# Patient Record
Sex: FEMALE | Race: WHITE | NOT HISPANIC OR LATINO | Employment: UNEMPLOYED | URBAN - METROPOLITAN AREA
[De-identification: names, ages, dates, MRNs, and addresses within clinical notes are randomized per-mention and may not be internally consistent; named-entity substitution may affect disease eponyms.]

---

## 2023-03-03 LAB
NON-UH HIE BASO COUNT: 0.04 X1000 (ref 0–0.2)
NON-UH HIE BASOS %: 0.4 %
NON-UH HIE DIFF?: NO
NON-UH HIE EOS COUNT: 0.23 X1000 (ref 0–0.5)
NON-UH HIE EOSIN %: 2.5 %
NON-UH HIE FERRITIN: 57 NG/ML (ref 10–291)
NON-UH HIE FREE T4: 0.94 NG/DL (ref 0.89–1.76)
NON-UH HIE HCT: 37 % (ref 36–46)
NON-UH HIE HGB: 12.3 G/DL (ref 12–16)
NON-UH HIE INSTR WBC: 9.1
NON-UH HIE IRON: 47 UG/DL (ref 40–170)
NON-UH HIE LYMPH %: 29.9 %
NON-UH HIE LYMPH COUNT: 2.72 X1000 (ref 1.2–4.8)
NON-UH HIE MCH: 27.8 PG (ref 27–34)
NON-UH HIE MCHC: 33.3 G/DL (ref 32–37)
NON-UH HIE MCV: 83.6 FL (ref 80–100)
NON-UH HIE MONO %: 5.8 %
NON-UH HIE MONO COUNT: 0.53 X1000 (ref 0.1–1)
NON-UH HIE MPV: 7.5 FL (ref 7.4–10.4)
NON-UH HIE NEUTROPHIL %: 61.4 %
NON-UH HIE NEUTROPHIL COUNT (ANC): 5.58 X1000 (ref 1.4–8.8)
NON-UH HIE NUCLEATED RBC: 0 /100WBC
NON-UH HIE PLATELET: 457 X10 (ref 150–450)
NON-UH HIE RBC: 4.42 X10 (ref 4.2–5.4)
NON-UH HIE RDW: 14.7 % (ref 11.5–14.5)
NON-UH HIE SATURATION: 12.6 % (ref 20–50)
NON-UH HIE TIBC: 372 UG/ML (ref 250–425)
NON-UH HIE TSH: 6.42 UIU/ML (ref 0.55–4.78)
NON-UH HIE VITAMIN B12: 690 PG/ML (ref 211–911)
NON-UH HIE WBC: 9.1 X10 (ref 4.5–11)

## 2023-03-06 LAB — NON-UH HIE ANTI-NUCLEAR ANTIBODY: NEGATIVE

## 2023-03-07 LAB — NON-UH HIE RHEUMATOID FACTOR: NEGATIVE

## 2023-03-09 ENCOUNTER — TELEPHONE (OUTPATIENT)
Dept: PRIMARY CARE | Facility: CLINIC | Age: 33
End: 2023-03-09
Payer: COMMERCIAL

## 2023-03-09 NOTE — TELEPHONE ENCOUNTER
----- Message from Vandana Batista DO sent at 3/7/2023  3:40 PM EST -----  Let pt know her rheumatoid factor and ERIK were both negative.

## 2023-03-14 ENCOUNTER — TELEPHONE (OUTPATIENT)
Dept: PRIMARY CARE | Facility: CLINIC | Age: 33
End: 2023-03-14
Payer: COMMERCIAL

## 2023-03-14 NOTE — TELEPHONE ENCOUNTER
----- Message from Vandana Batista DO sent at 3/13/2023  8:42 PM EDT -----  In addition to prior msg with lab results can you let pt know her ERIK and rheumatoid factor were both negative.     ----- Message -----  From: Mireya Burch CMA  Sent: 3/13/2023   8:20 AM EDT  To: Vandana Batista DO

## 2023-03-14 NOTE — TELEPHONE ENCOUNTER
----- Message from Vandana Batista DO sent at 3/13/2023  8:38 PM EDT -----  Please let pt know their screening thyroid test was slightly out of range but the actual level of circulating thyroid hormone was normal so as long as she is feeling ok, I recommend she continue her current dose of levothyroxine.   Her repeat red blood cell counts are now normal and her iron and vitamin B12 levels are ok.   Her platelet count is now just slightly elevated at 457(normal is less than 450).  I recommend she follow up in a few mo to see how she is doing and recheck labs at that time.     ----- Message -----  From: Mireya Burch CMA  Sent: 3/13/2023   8:20 AM EDT  To: Vandana Batista DO

## 2023-03-28 DIAGNOSIS — E03.9 HYPOTHYROIDISM, UNSPECIFIED TYPE: ICD-10-CM

## 2023-03-28 RX ORDER — LEVOTHYROXINE SODIUM 50 UG/1
1 TABLET ORAL DAILY
COMMUNITY
Start: 2022-08-15 | End: 2023-03-28 | Stop reason: SDUPTHER

## 2023-03-29 RX ORDER — LEVOTHYROXINE SODIUM 50 UG/1
50 TABLET ORAL DAILY
Qty: 30 TABLET | Refills: 3 | Status: SHIPPED | OUTPATIENT
Start: 2023-03-29 | End: 2023-05-01 | Stop reason: SDUPTHER

## 2023-05-01 DIAGNOSIS — E03.9 HYPOTHYROIDISM, UNSPECIFIED TYPE: ICD-10-CM

## 2023-05-01 RX ORDER — LEVOTHYROXINE SODIUM 50 UG/1
50 TABLET ORAL DAILY
Qty: 30 TABLET | Refills: 3 | Status: SHIPPED | OUTPATIENT
Start: 2023-05-01 | End: 2023-07-11 | Stop reason: SDUPTHER

## 2023-06-06 ENCOUNTER — OFFICE VISIT (OUTPATIENT)
Dept: PRIMARY CARE | Facility: CLINIC | Age: 33
End: 2023-06-06
Payer: COMMERCIAL

## 2023-06-06 VITALS
TEMPERATURE: 97.9 F | HEIGHT: 57 IN | SYSTOLIC BLOOD PRESSURE: 107 MMHG | WEIGHT: 163.4 LBS | DIASTOLIC BLOOD PRESSURE: 75 MMHG | HEART RATE: 70 BPM | BODY MASS INDEX: 35.25 KG/M2

## 2023-06-06 DIAGNOSIS — E03.9 HYPOTHYROIDISM, UNSPECIFIED TYPE: ICD-10-CM

## 2023-06-06 DIAGNOSIS — M25.562 ACUTE PAIN OF LEFT KNEE: Primary | ICD-10-CM

## 2023-06-06 DIAGNOSIS — J02.9 SORE THROAT: ICD-10-CM

## 2023-06-06 DIAGNOSIS — B37.2 INTERTRIGINOUS CANDIDIASIS: ICD-10-CM

## 2023-06-06 PROBLEM — F32.A DEPRESSION: Status: ACTIVE | Noted: 2023-06-06

## 2023-06-06 PROBLEM — F41.9 ANXIETY: Status: ACTIVE | Noted: 2022-04-06

## 2023-06-06 PROBLEM — D64.9 ANEMIA: Status: ACTIVE | Noted: 2023-06-06

## 2023-06-06 LAB — POC RAPID STREP: NEGATIVE

## 2023-06-06 PROCEDURE — 99214 OFFICE O/P EST MOD 30 MIN: CPT | Performed by: FAMILY MEDICINE

## 2023-06-06 PROCEDURE — 87880 STREP A ASSAY W/OPTIC: CPT | Performed by: FAMILY MEDICINE

## 2023-06-06 PROCEDURE — 1036F TOBACCO NON-USER: CPT | Performed by: FAMILY MEDICINE

## 2023-06-06 RX ORDER — SERTRALINE HCL 100 MG
150 TABLET ORAL DAILY
COMMUNITY
Start: 2023-05-25 | End: 2024-06-10 | Stop reason: ALTCHOICE

## 2023-06-06 RX ORDER — NYSTATIN 100000 U/G
CREAM TOPICAL 2 TIMES DAILY
Qty: 30 G | Refills: 1 | Status: SHIPPED | OUTPATIENT
Start: 2023-06-06 | End: 2023-06-13

## 2023-06-06 RX ORDER — NYSTATIN 100000 [USP'U]/G
POWDER TOPICAL AS NEEDED
Qty: 60 G | Refills: 2 | Status: SHIPPED | OUTPATIENT
Start: 2023-06-06 | End: 2024-06-10 | Stop reason: ALTCHOICE

## 2023-06-06 ASSESSMENT — PATIENT HEALTH QUESTIONNAIRE - PHQ9
1. LITTLE INTEREST OR PLEASURE IN DOING THINGS: NOT AT ALL
2. FEELING DOWN, DEPRESSED OR HOPELESS: NOT AT ALL
SUM OF ALL RESPONSES TO PHQ9 QUESTIONS 1 AND 2: 0

## 2023-06-06 NOTE — PROGRESS NOTES
"Subjective   Patient ID: Kathleen Chacko is a 33 y.o. female who presents for Knee Pain (Left knee pain that feels like its burning, usually happens when she kneels down. ) and Nasal Congestion (Cough, sore throat, body aches, fever, and chills for 4 days.  Did home covid test and was negative on Sunday.).    HPI   33-year-old female presents complaining of persistent left knee pain.  No known injury but does sit crosslegged a lot  Painful to kneel on it no swelling, redness, warmth.  No history of prior knee issues.  No locking up or giving out.  Was in ER recently with  Palpitations.  Was told it was likely related to anxiety.  hx hypothyroidism- taking levo 50mcg 1.5 pills  Recent tsh/free T4 in er were wnl     Hydrated.  Follow-up if symptoms persist or worsen.  Check x-ray of also complains of A yeast rash in her inguinal area and requests prescription for nystatin cream    Also complains of recent nausea and diarrhea for past few days as well as sore throat, body aches, and chills.  Has not taken her temperature.  Both her kids had recent nausea vomiting and diarrhea she did test for COVID which was negative.  No known exposure to strep   Review of Systems  ROS as stated in HPI  Objective   /75   Pulse 70   Temp 36.6 °C (97.9 °F)   Ht 1.448 m (4' 9\")   Wt 74.1 kg (163 lb 6.4 oz)   BMI 35.36 kg/m²     Physical Exam  Constitutional: A&O; NAD  HEENT: conjunctiva normal. EOMI; PERRLA; lids normal; TM's clear; mild posterior pharyngeal erythema. +PND;   Neck: supple; No lymphadenopathy   Heart: RRR     Lungs: CTA bilateral   Abd: Soft, Nontender, Nondistended  Ext:  No edema; left knee with tenderness along lateral joint line.  Negative Lachman's.  Negative Yeimy's.  No joint instability.    Neuro: No gross neuro deficits    Psych: Judgment, orientation, mood, affect, insight wnl   Breast:  inspection normal.  No masses to palpation; No nipple discharge.  No asymmetry.  Axillae normal.   Genital: External " vaginal area, cervix, uterus, adnexa wnl.  CMT  absent  Assessment/Plan   Problem List Items Addressed This Visit          Endocrine/Metabolic    Hypothyroidism     Other Visit Diagnoses       Acute pain of left knee    -  Primary    Relevant Orders    XR knee left 3 views    Referral to Physical Therapy    Sore throat        Relevant Orders    POCT Rapid Strep A manually resulted (Completed)    Intertriginous candidiasis        Relevant Medications    nystatin (Mycostatin) cream    nystatin (Mycostatin) 100,000 unit/gram powder          Check RST-neg; likely viral; stay hydrated.    Check x-ray of left knee.  Refer to physical therapy.  Follow-up if persists or worsens.  Reviewed records and labs from recent ER visit.  Recent TSH in ER was wnl- continue current dose of levothyroxine.  Rx nystatin cream and powder.

## 2023-06-08 ENCOUNTER — TELEPHONE (OUTPATIENT)
Dept: PRIMARY CARE | Facility: CLINIC | Age: 33
End: 2023-06-08
Payer: COMMERCIAL

## 2023-06-08 NOTE — TELEPHONE ENCOUNTER
----- Message from Vandana Batista DO sent at 6/7/2023  8:13 PM EDT -----  Please let patient know the x-ray of her knee was normal.  I recommend she try physical therapy and let us know if symptoms persist or worsen

## 2023-07-11 DIAGNOSIS — E03.9 HYPOTHYROIDISM, UNSPECIFIED TYPE: ICD-10-CM

## 2023-07-11 RX ORDER — LEVOTHYROXINE SODIUM 50 UG/1
75 TABLET ORAL DAILY
Qty: 135 TABLET | Refills: 1 | Status: SHIPPED | OUTPATIENT
Start: 2023-07-11 | End: 2024-04-04 | Stop reason: SDUPTHER

## 2023-10-19 ENCOUNTER — OFFICE VISIT (OUTPATIENT)
Dept: PRIMARY CARE | Facility: CLINIC | Age: 33
End: 2023-10-19
Payer: COMMERCIAL

## 2023-10-19 VITALS
SYSTOLIC BLOOD PRESSURE: 117 MMHG | DIASTOLIC BLOOD PRESSURE: 71 MMHG | RESPIRATION RATE: 16 BRPM | HEART RATE: 80 BPM | WEIGHT: 169 LBS | BODY MASS INDEX: 36.46 KG/M2 | HEIGHT: 57 IN | TEMPERATURE: 98.3 F | OXYGEN SATURATION: 97 %

## 2023-10-19 DIAGNOSIS — H60.542: ICD-10-CM

## 2023-10-19 DIAGNOSIS — Z00.00 HEALTHCARE MAINTENANCE: ICD-10-CM

## 2023-10-19 DIAGNOSIS — R53.83 OTHER FATIGUE: ICD-10-CM

## 2023-10-19 DIAGNOSIS — E03.9 HYPOTHYROIDISM, UNSPECIFIED TYPE: Primary | ICD-10-CM

## 2023-10-19 DIAGNOSIS — D64.9 ANEMIA, UNSPECIFIED TYPE: ICD-10-CM

## 2023-10-19 PROCEDURE — 1036F TOBACCO NON-USER: CPT | Performed by: FAMILY MEDICINE

## 2023-10-19 PROCEDURE — 99214 OFFICE O/P EST MOD 30 MIN: CPT | Performed by: FAMILY MEDICINE

## 2023-10-19 RX ORDER — FLUOCINOLONE ACETONIDE 0.11 MG/ML
5 OIL AURICULAR (OTIC) 2 TIMES DAILY
Qty: 20 ML | Refills: 0 | Status: SHIPPED | OUTPATIENT
Start: 2023-10-19

## 2023-10-19 NOTE — PROGRESS NOTES
"Subjective   Patient ID: Kathleen Chacko is a 33 y.o. female who presents for Medication Question and Earache (Left ear infection).    HPI   33-year-old female presents for follow-up and complains of left ear canal itching and pain around her jaw and behind her left ear.  Is having some dental issues so is not sure if her neck pain is related to that.  No recent congestion, URI, fevers or chills.  No drainage from the ear.  No history of eczema in the ear canal.  No sore throat.  History of hypothyroidism.  Was started on levothyroxine after labs in August 2022.  Is wondering if she has Hashimoto's or if there is a possibility she may be able to stop the medication at some point.  Patient does complain of fatigue.  Also complains of some palpitations but associates that with her anxiety.    No thyroid tenderness or pain.  She had a baby in april 2022  Is still nursing.  Hx of mild anemia in past  Her periods are more reg. occ heavy flow.  Denies any nausea, vomiting, reflux, diarrhea, constipation, blood in stool or melena.    History of depression. Sees psychiatrist. on zoloft     Co body aches/pains;  RF ERIK were negative in March.  Is wondering if she may have fibromyalgia.    Review of Systems  ROS as stated in HPI    Objective   /71 (BP Location: Right arm, Patient Position: Sitting)   Pulse 80   Temp 36.8 °C (98.3 °F)   Resp 16   Ht 1.448 m (4' 9\")   Wt 76.7 kg (169 lb)   SpO2 97%   BMI 36.57 kg/m²     Physical Exam  Constitutional: A&O; NAD  HEENT: conjunctiva normal. EOMI; PERRLA; lids normal; TM's clear; left canal  with slight cerumen along canal with some dry skin  Neck: supple; No lymphadenopathy   Heart: RRR     Lungs: CTA bilateral   Abd: Soft, Nontender, Nondistended  Ext:  No edema;    Neuro: No gross neuro deficits     Psych: Judgment, orientation, mood, affect, insight wnl   Assessment/Plan   Problem List Items Addressed This Visit             ICD-10-CM    Anemia D64.9    Relevant Orders "    CBC and Auto Differential    Ferritin    Iron and TIBC    Vitamin B12    Hypothyroidism - Primary E03.9    Relevant Orders    Thyroid Stimulating Hormone    T4, free    Thyroid Peroxidase (TPO) Antibody     Other Visit Diagnoses         Codes    Acute eczematoid otitis externa, left ear     H60.542    Relevant Medications    fluocinolone (DermOtic) 0.01 % ear drops    Other fatigue     R53.83    Relevant Orders    Comprehensive Metabolic Panel    Healthcare maintenance     Z00.00    Relevant Orders    Lipid Panel            Check labs/thyroid antibody  Try dermotic prn  Fu with dentist  Fu with rheum

## 2023-11-03 ENCOUNTER — APPOINTMENT (OUTPATIENT)
Dept: OBSTETRICS AND GYNECOLOGY | Facility: CLINIC | Age: 33
End: 2023-11-03
Payer: COMMERCIAL

## 2023-12-11 ENCOUNTER — APPOINTMENT (OUTPATIENT)
Dept: OBSTETRICS AND GYNECOLOGY | Facility: CLINIC | Age: 33
End: 2023-12-11
Payer: COMMERCIAL

## 2024-04-02 ENCOUNTER — TELEPHONE (OUTPATIENT)
Dept: PRIMARY CARE | Facility: CLINIC | Age: 34
End: 2024-04-02
Payer: COMMERCIAL

## 2024-04-02 NOTE — TELEPHONE ENCOUNTER
Caller self, pt is out of thyroid med and needs to be seen to discuss hair loss   Please call pt to see what day you wants her to be seen  [phone]

## 2024-04-04 DIAGNOSIS — E03.9 HYPOTHYROIDISM, UNSPECIFIED TYPE: ICD-10-CM

## 2024-04-04 RX ORDER — LEVOTHYROXINE SODIUM 50 UG/1
75 TABLET ORAL DAILY
Qty: 135 TABLET | Refills: 1 | Status: SHIPPED | OUTPATIENT
Start: 2024-04-04

## 2024-04-04 NOTE — TELEPHONE ENCOUNTER
Pt needs refill of Levothyroxine, I scheduled her for her annual wellness for July 11th at 9:30. Pt asks if you would please refill her med in the mean time. [facility] in [place]  [address]

## 2024-06-07 NOTE — PROGRESS NOTES
"Subjective   Patient ID: Kathleen Chacko is a 34 y.o. female who presents for Pain (Numbness and tingling in both arms and legs.  ).    HPI   35 yo female presents co a couple wk hx of pain in both arms and legs with tingling in both arms and legs  Co neck /upper back /lower back pains  NKI  Has been doing a lot of yard work- digging dirt    Has been using iburpofen and tylenol prn    Hx of headaches/migraines    Pt is a massage therapist    History of hypothyroidism.  Was started on levothyroxine after labs in August 2022.  Taking 50mcg 1.5 pills.   Hx of mild anemia in past  Her periods are more reg. occ heavy flow.  Denies any nausea, vomiting, reflux, diarrhea, constipation, blood in stool or melena.    History of depression. Sees psychiatrist. Off zoloft   And now on wellbutrin     Review of Systems  ROS as stated in HPI     Objective   /69   Pulse 74   Temp 36.7 °C (98.1 °F)   Ht 1.448 m (4' 9\")   Wt 79.8 kg (176 lb)   BMI 38.09 kg/m²     Physical Exam  Constitutional: A&O; NAD  HEENT: conjunctiva normal. EOMI; PERRLA; lids normal; TM's clear;   Neck: supple; No lymphadenopathy   Heart: RRR     Lungs: CTA bilateral   Abd: Soft, Nontender, Nondistended  Ext:  No edema;    Neuro: No gross neuro deficits     Psych: Judgment, orientation, mood, affect, insight wnl  Musculoskeletal: +paraspinal muscle tenderness; no spinous process tenderness;  UE and LE strength and sensation intact.  full shoulder ROM.  +SI joint tenderness;  negative straight leg   negative reverse straight leg. LE reflexes, strength and sensation intact     Assessment/Plan   Problem List Items Addressed This Visit             ICD-10-CM    Hypothyroidism E03.9     Other Visit Diagnoses         Codes    Neck pain    -  Primary M54.2    Relevant Orders    XR cervical spine 2-3 views (Completed)    EMG & nerve conduction    Back pain, unspecified back location, unspecified back pain laterality, unspecified chronicity     M54.9    Relevant " Orders    XR thoracic spine 2 views (Completed)    XR lumbar spine 2-3 views (Completed)    EMG & nerve conduction    Paresthesia of upper and lower extremities of both sides     R20.2    Relevant Orders    XR cervical spine 2-3 views (Completed)    XR thoracic spine 2 views (Completed)    XR lumbar spine 2-3 views (Completed)    EMG & nerve conduction          Check xrays and EMG  Check labs- has orders from oct   Is not fasting- will check lipids at a later date

## 2024-06-10 ENCOUNTER — HOSPITAL ENCOUNTER (OUTPATIENT)
Dept: RADIOLOGY | Facility: EXTERNAL LOCATION | Age: 34
Discharge: HOME | End: 2024-06-10

## 2024-06-10 ENCOUNTER — OFFICE VISIT (OUTPATIENT)
Dept: PRIMARY CARE | Facility: CLINIC | Age: 34
End: 2024-06-10
Payer: COMMERCIAL

## 2024-06-10 ENCOUNTER — LAB (OUTPATIENT)
Dept: LAB | Facility: LAB | Age: 34
End: 2024-06-10
Payer: COMMERCIAL

## 2024-06-10 VITALS
WEIGHT: 176 LBS | BODY MASS INDEX: 37.97 KG/M2 | TEMPERATURE: 98.1 F | HEIGHT: 57 IN | HEART RATE: 74 BPM | DIASTOLIC BLOOD PRESSURE: 69 MMHG | SYSTOLIC BLOOD PRESSURE: 108 MMHG

## 2024-06-10 DIAGNOSIS — E03.9 HYPOTHYROIDISM, UNSPECIFIED TYPE: ICD-10-CM

## 2024-06-10 DIAGNOSIS — M54.9 BACK PAIN, UNSPECIFIED BACK LOCATION, UNSPECIFIED BACK PAIN LATERALITY, UNSPECIFIED CHRONICITY: ICD-10-CM

## 2024-06-10 DIAGNOSIS — R20.2 PARESTHESIA OF UPPER AND LOWER EXTREMITIES OF BOTH SIDES: ICD-10-CM

## 2024-06-10 DIAGNOSIS — M54.2 NECK PAIN: Primary | ICD-10-CM

## 2024-06-10 DIAGNOSIS — D64.9 ANEMIA, UNSPECIFIED TYPE: ICD-10-CM

## 2024-06-10 DIAGNOSIS — M54.2 NECK PAIN: ICD-10-CM

## 2024-06-10 DIAGNOSIS — E55.9 VITAMIN D DEFICIENCY: ICD-10-CM

## 2024-06-10 DIAGNOSIS — R53.83 OTHER FATIGUE: ICD-10-CM

## 2024-06-10 PROCEDURE — 82306 VITAMIN D 25 HYDROXY: CPT

## 2024-06-10 PROCEDURE — 80053 COMPREHEN METABOLIC PANEL: CPT

## 2024-06-10 PROCEDURE — 85025 COMPLETE CBC W/AUTO DIFF WBC: CPT

## 2024-06-10 PROCEDURE — 1036F TOBACCO NON-USER: CPT | Performed by: FAMILY MEDICINE

## 2024-06-10 PROCEDURE — 36415 COLL VENOUS BLD VENIPUNCTURE: CPT

## 2024-06-10 PROCEDURE — 99214 OFFICE O/P EST MOD 30 MIN: CPT | Performed by: FAMILY MEDICINE

## 2024-06-10 PROCEDURE — 84439 ASSAY OF FREE THYROXINE: CPT

## 2024-06-10 PROCEDURE — 83540 ASSAY OF IRON: CPT

## 2024-06-10 PROCEDURE — 83550 IRON BINDING TEST: CPT

## 2024-06-10 PROCEDURE — 82728 ASSAY OF FERRITIN: CPT

## 2024-06-10 PROCEDURE — 82607 VITAMIN B-12: CPT

## 2024-06-10 PROCEDURE — 84443 ASSAY THYROID STIM HORMONE: CPT

## 2024-06-10 PROCEDURE — 86376 MICROSOMAL ANTIBODY EACH: CPT

## 2024-06-10 RX ORDER — BUPROPION HYDROCHLORIDE 150 MG/1
150 TABLET ORAL
COMMUNITY

## 2024-06-10 ASSESSMENT — PATIENT HEALTH QUESTIONNAIRE - PHQ9
SUM OF ALL RESPONSES TO PHQ9 QUESTIONS 1 AND 2: 0
1. LITTLE INTEREST OR PLEASURE IN DOING THINGS: NOT AT ALL
2. FEELING DOWN, DEPRESSED OR HOPELESS: NOT AT ALL

## 2024-06-11 DIAGNOSIS — E55.9 VITAMIN D DEFICIENCY: Primary | ICD-10-CM

## 2024-06-11 LAB
25(OH)D3 SERPL-MCNC: 27 NG/ML (ref 30–100)
ALBUMIN SERPL BCP-MCNC: 4.6 G/DL (ref 3.4–5)
ALP SERPL-CCNC: 83 U/L (ref 33–110)
ALT SERPL W P-5'-P-CCNC: 10 U/L (ref 7–45)
ANION GAP SERPL CALC-SCNC: 15 MMOL/L (ref 10–20)
AST SERPL W P-5'-P-CCNC: 14 U/L (ref 9–39)
BASOPHILS # BLD AUTO: 0.07 X10*3/UL (ref 0–0.1)
BASOPHILS NFR BLD AUTO: 0.8 %
BILIRUB SERPL-MCNC: 0.3 MG/DL (ref 0–1.2)
BUN SERPL-MCNC: 14 MG/DL (ref 6–23)
CALCIUM SERPL-MCNC: 9.8 MG/DL (ref 8.6–10.6)
CHLORIDE SERPL-SCNC: 103 MMOL/L (ref 98–107)
CO2 SERPL-SCNC: 25 MMOL/L (ref 21–32)
CREAT SERPL-MCNC: 0.73 MG/DL (ref 0.5–1.05)
EGFRCR SERPLBLD CKD-EPI 2021: >90 ML/MIN/1.73M*2
EOSINOPHIL # BLD AUTO: 0.25 X10*3/UL (ref 0–0.7)
EOSINOPHIL NFR BLD AUTO: 2.7 %
ERYTHROCYTE [DISTWIDTH] IN BLOOD BY AUTOMATED COUNT: 13.3 % (ref 11.5–14.5)
FERRITIN SERPL-MCNC: 163 NG/ML (ref 8–150)
GLUCOSE SERPL-MCNC: 77 MG/DL (ref 74–99)
HCT VFR BLD AUTO: 36.8 % (ref 36–46)
HGB BLD-MCNC: 11.7 G/DL (ref 12–16)
IMM GRANULOCYTES # BLD AUTO: 0.03 X10*3/UL (ref 0–0.7)
IMM GRANULOCYTES NFR BLD AUTO: 0.3 % (ref 0–0.9)
IRON SATN MFR SERPL: 22 % (ref 25–45)
IRON SERPL-MCNC: 90 UG/DL (ref 35–150)
LYMPHOCYTES # BLD AUTO: 2.39 X10*3/UL (ref 1.2–4.8)
LYMPHOCYTES NFR BLD AUTO: 26 %
MCH RBC QN AUTO: 27.9 PG (ref 26–34)
MCHC RBC AUTO-ENTMCNC: 31.8 G/DL (ref 32–36)
MCV RBC AUTO: 88 FL (ref 80–100)
MONOCYTES # BLD AUTO: 0.51 X10*3/UL (ref 0.1–1)
MONOCYTES NFR BLD AUTO: 5.6 %
NEUTROPHILS # BLD AUTO: 5.93 X10*3/UL (ref 1.2–7.7)
NEUTROPHILS NFR BLD AUTO: 64.6 %
NRBC BLD-RTO: 0 /100 WBCS (ref 0–0)
PLATELET # BLD AUTO: 477 X10*3/UL (ref 150–450)
POTASSIUM SERPL-SCNC: 4.7 MMOL/L (ref 3.5–5.3)
PROT SERPL-MCNC: 7.2 G/DL (ref 6.4–8.2)
RBC # BLD AUTO: 4.19 X10*6/UL (ref 4–5.2)
SODIUM SERPL-SCNC: 138 MMOL/L (ref 136–145)
T4 FREE SERPL-MCNC: 1.13 NG/DL (ref 0.78–1.48)
THYROPEROXIDASE AB SERPL-ACNC: >1000 IU/ML
TIBC SERPL-MCNC: 414 UG/DL (ref 240–445)
TSH SERPL-ACNC: 2.88 MIU/L (ref 0.44–3.98)
UIBC SERPL-MCNC: 324 UG/DL (ref 110–370)
VIT B12 SERPL-MCNC: 554 PG/ML (ref 211–911)
WBC # BLD AUTO: 9.2 X10*3/UL (ref 4.4–11.3)

## 2024-06-12 DIAGNOSIS — D64.9 ANEMIA, UNSPECIFIED TYPE: ICD-10-CM

## 2024-06-12 DIAGNOSIS — D75.839 THROMBOCYTOSIS: ICD-10-CM

## 2024-06-12 DIAGNOSIS — E06.3 HASHIMOTO'S DISEASE: ICD-10-CM

## 2024-07-08 NOTE — PROGRESS NOTES
"Subjective   Patient ID: Kathleen Chacko is a 34 y.o. female who presents for Annual Exam (Sees GYN for exams. Was taking an antibiotic and now has a yeast infection.  Hair loss and headaches. ).    HPI   33 yo female presents for physical    Co vaginal yeast infection and in groin  since being on Cephalexin recently from her derm for a staph infection    Sees gyn for exams- due to see    immunizations:  Adacel 2020  flu shot defers    BMI38  Starting working with a  recently  No recent Lipids- defers at this time    Due to see optho; wears glasses  sees her dentist for regular cleanings      She denies any chest pains, palpitations, edema, shortness of breath, abdominal pains, reflux, nausea, vomiting, diarrhea, constipation, blood in stool, melena.    Denies any mole changes.    History of hypothyroidism.  Was started on levothyroxine after labs in August 2022.  Taking 50mcg 1.5 pills.     Recent labs: +TPO ab;    mild anemia and elevated platelets and ferritin; normal iron and b12;   Has upcoming appt with endo and heme  Fam hx of anemia    ERIK and RF were neg last yr    Her periods are more reg. occ heavy flow.    Denies any nausea, vomiting, reflux, diarrhea, constipation, blood in stool or melena.     History of depression. Sees psychiatrist. Off zoloft snd now on wellbutrin;  hx PTSD  Has intake appt chata for therapist  Hx of alcoholism- sober for 9 yrs. No urges to drink  Has not been attending as many AA mtgs; does have a sponsor.  Has supportive     Was seen recently with of pain in both arms and legs with tingling in both arms and legs  Co neck /upper back /lower back pains   Hasn't done xrays  Has EMG scheduled  Has appts scheduled with heme and endo    Hx of headaches/migraines   saw neuro last yr      Review of Systems  ROS as stated in HPI    Objective   /80   Pulse 90   Temp 36.8 °C (98.2 °F)   Ht 1.448 m (4' 9\")   Wt 79.7 kg (175 lb 12.8 oz)   BMI 38.04 kg/m²     Physical " Exam  Constitutional: A&O; NAD  HEENT: conjunctiva normal. EOMI; PERRLA; lids normal; TM's clear;   Neck: supple; No lymphadenopathy   Heart: RRR     Lungs: CTA bilateral   Abd: Soft, Nontender, Nondistended  Ext:  No edema;    Neuro: No gross neuro deficits     Psych: Judgment, orientation, mood, affect, insight wnl   Assessment/Plan   Problem List Items Addressed This Visit             ICD-10-CM    Major depressive disorder, recurrent severe without psychotic features (Multi) F33.2    History of alcoholism (Multi) F10.21     Other Visit Diagnoses         Codes    Healthcare maintenance    -  Primary Z00.00    Yeast infection     B37.9    Relevant Medications    fluconazole (Diflucan) 150 mg tablet    nystatin (Mycostatin) cream          Sees gyn for exams  Tdap 2020  healthy lifestyle-diet, exercise  .   Defers checking lipids at this time  Has upcoming EMG and orders for xrays  Has upcoming appt with endo and heme  Has intake tomorrow with psych   Rx diflucan and nystatin crm sent; fu if persists

## 2024-07-11 ENCOUNTER — APPOINTMENT (OUTPATIENT)
Dept: PRIMARY CARE | Facility: CLINIC | Age: 34
End: 2024-07-11
Payer: COMMERCIAL

## 2024-07-11 VITALS
HEART RATE: 90 BPM | SYSTOLIC BLOOD PRESSURE: 130 MMHG | DIASTOLIC BLOOD PRESSURE: 80 MMHG | WEIGHT: 175.8 LBS | TEMPERATURE: 98.2 F | HEIGHT: 57 IN | BODY MASS INDEX: 37.93 KG/M2

## 2024-07-11 DIAGNOSIS — Z00.00 HEALTHCARE MAINTENANCE: Primary | ICD-10-CM

## 2024-07-11 DIAGNOSIS — F10.21 HISTORY OF ALCOHOLISM (MULTI): ICD-10-CM

## 2024-07-11 DIAGNOSIS — F33.2 MAJOR DEPRESSIVE DISORDER, RECURRENT SEVERE WITHOUT PSYCHOTIC FEATURES (MULTI): ICD-10-CM

## 2024-07-11 DIAGNOSIS — B37.9 YEAST INFECTION: ICD-10-CM

## 2024-07-11 PROCEDURE — 99395 PREV VISIT EST AGE 18-39: CPT | Performed by: FAMILY MEDICINE

## 2024-07-11 PROCEDURE — 1036F TOBACCO NON-USER: CPT | Performed by: FAMILY MEDICINE

## 2024-07-11 RX ORDER — FLUCONAZOLE 150 MG/1
150 TABLET ORAL ONCE
Qty: 1 TABLET | Refills: 0 | Status: SHIPPED | OUTPATIENT
Start: 2024-07-11 | End: 2024-07-11

## 2024-07-11 RX ORDER — BUPROPION HYDROCHLORIDE 75 MG/1
1 TABLET ORAL
COMMUNITY
Start: 2024-06-19

## 2024-07-11 RX ORDER — NYSTATIN 100000 U/G
CREAM TOPICAL 2 TIMES DAILY
Qty: 15 G | Refills: 0 | Status: SHIPPED | OUTPATIENT
Start: 2024-07-11 | End: 2024-07-18

## 2024-07-11 ASSESSMENT — PATIENT HEALTH QUESTIONNAIRE - PHQ9
7. TROUBLE CONCENTRATING ON THINGS, SUCH AS READING THE NEWSPAPER OR WATCHING TELEVISION: SEVERAL DAYS
2. FEELING DOWN, DEPRESSED OR HOPELESS: SEVERAL DAYS
SUM OF ALL RESPONSES TO PHQ QUESTIONS 1-9: 8
3. TROUBLE FALLING OR STAYING ASLEEP OR SLEEPING TOO MUCH: NOT AT ALL
6. FEELING BAD ABOUT YOURSELF - OR THAT YOU ARE A FAILURE OR HAVE LET YOURSELF OR YOUR FAMILY DOWN: MORE THAN HALF THE DAYS
10. IF YOU CHECKED OFF ANY PROBLEMS, HOW DIFFICULT HAVE THESE PROBLEMS MADE IT FOR YOU TO DO YOUR WORK, TAKE CARE OF THINGS AT HOME, OR GET ALONG WITH OTHER PEOPLE: VERY DIFFICULT
SUM OF ALL RESPONSES TO PHQ9 QUESTIONS 1 AND 2: 2
9. THOUGHTS THAT YOU WOULD BE BETTER OFF DEAD, OR OF HURTING YOURSELF: NOT AT ALL
4. FEELING TIRED OR HAVING LITTLE ENERGY: MORE THAN HALF THE DAYS
1. LITTLE INTEREST OR PLEASURE IN DOING THINGS: SEVERAL DAYS
5. POOR APPETITE OR OVEREATING: SEVERAL DAYS
8. MOVING OR SPEAKING SO SLOWLY THAT OTHER PEOPLE COULD HAVE NOTICED. OR THE OPPOSITE, BEING SO FIGETY OR RESTLESS THAT YOU HAVE BEEN MOVING AROUND A LOT MORE THAN USUAL: NOT AT ALL

## 2024-08-05 ENCOUNTER — APPOINTMENT (OUTPATIENT)
Dept: OBSTETRICS AND GYNECOLOGY | Facility: CLINIC | Age: 34
End: 2024-08-05
Payer: COMMERCIAL

## 2024-08-21 ENCOUNTER — OFFICE VISIT (OUTPATIENT)
Dept: OBSTETRICS AND GYNECOLOGY | Facility: CLINIC | Age: 34
End: 2024-08-21
Payer: COMMERCIAL

## 2024-08-21 VITALS — SYSTOLIC BLOOD PRESSURE: 112 MMHG | DIASTOLIC BLOOD PRESSURE: 60 MMHG | BODY MASS INDEX: 37 KG/M2 | WEIGHT: 171 LBS

## 2024-08-21 DIAGNOSIS — N89.8 VAGINAL DISCHARGE: ICD-10-CM

## 2024-08-21 DIAGNOSIS — Z12.4 SCREENING FOR MALIGNANT NEOPLASM OF CERVIX: Primary | ICD-10-CM

## 2024-08-21 DIAGNOSIS — F32.81 PMDD (PREMENSTRUAL DYSPHORIC DISORDER): ICD-10-CM

## 2024-08-21 PROCEDURE — 87624 HPV HI-RISK TYP POOLED RSLT: CPT

## 2024-08-21 PROCEDURE — 1036F TOBACCO NON-USER: CPT | Performed by: OBSTETRICS & GYNECOLOGY

## 2024-08-21 PROCEDURE — 87205 SMEAR GRAM STAIN: CPT

## 2024-08-21 PROCEDURE — 99203 OFFICE O/P NEW LOW 30 MIN: CPT | Performed by: OBSTETRICS & GYNECOLOGY

## 2024-08-21 PROCEDURE — 88175 CYTOPATH C/V AUTO FLUID REDO: CPT

## 2024-08-21 RX ORDER — VENLAFAXINE HYDROCHLORIDE 75 MG/1
75 CAPSULE, EXTENDED RELEASE ORAL DAILY
COMMUNITY

## 2024-08-21 ASSESSMENT — ENCOUNTER SYMPTOMS
DIZZINESS: 0
VOMITING: 0
WEAKNESS: 0
FEVER: 0
PALPITATIONS: 0
DYSURIA: 0
CHILLS: 0
FREQUENCY: 0
DIARRHEA: 0
NAUSEA: 0
DYSPHORIC MOOD: 1
HEMATURIA: 0
SHORTNESS OF BREATH: 0
ABDOMINAL PAIN: 0
COUGH: 0
CONSTIPATION: 0
HEADACHES: 0

## 2024-08-21 NOTE — PROGRESS NOTES
SUBJECTIVE    34 y.o. Having periods presents for   Chief Complaint   Patient presents with    Follow-up     Here for abdominal pain and vaginal discharge   Jenny Lewis CMA        Reports that she has had persistent vaginal discharge after yeast treatment by PCP. Has some abdominal pain but that is resolved.     Reports suicidal ideation and other severe symptoms with menses. Was pink slipped during last cycle, happens 2 times per year.     OB/GYN History  Patient's last menstrual period was 08/05/2024 (approximate).    Social History     Substance and Sexual Activity   Sexual Activity Yes    Partners: Male    Birth control/protection: Male Sterilization       Sexually transmitted infections:no past history    OB History   No obstetric history on file.       The following portions of the chart were reviewed this encounter and updated as appropriate:           Screenings  Social Determinants of Health     Tobacco Use: Medium Risk (7/11/2024)    Patient History     Smoking Tobacco Use: Former     Smokeless Tobacco Use: Never     Passive Exposure: Not on file   Alcohol Use: Not on file   Financial Resource Strain: Not on file   Food Insecurity: Not on file   Transportation Needs: Not on file   Physical Activity: Not on file   Stress: Not on file   Social Connections: Not on file   Intimate Partner Violence: Not on file   Depression: Not at risk (7/11/2024)    PHQ-2     PHQ-2 Score: 2   Housing Stability: Not on file   Utilities: Not on file   Digital Equity: Not on file   Health Literacy: Not on file         Review of Systems  Review of Systems   Constitutional:  Negative for chills and fever.   Eyes:  Negative for visual disturbance.   Respiratory:  Negative for cough and shortness of breath.    Cardiovascular:  Negative for chest pain and palpitations.   Gastrointestinal:  Negative for abdominal pain, constipation, diarrhea, nausea and vomiting.   Genitourinary:  Positive for pelvic pain and vaginal discharge.  Negative for dyspareunia, dysuria, frequency, hematuria, urgency and vaginal bleeding.   Neurological:  Negative for dizziness, weakness and headaches.   Psychiatric/Behavioral:  Positive for dysphoric mood and suicidal ideas.         OBJECTIVE  Vitals:    08/21/24 1544   BP: 112/60   Weight: 77.6 kg (171 lb)     Body mass index is 37 kg/m².     Physical Exam  Constitutional:       General: She is not in acute distress.     Appearance: Normal appearance.   Genitourinary:      Vulva and rectum normal.      Right Labia: No skin changes.     Left Labia: No skin changes.     No vaginal discharge.      No cervical discharge or lesion.   HENT:      Head: Normocephalic and atraumatic.      Nose: Nose normal.      Mouth/Throat:      Mouth: Mucous membranes are moist.      Pharynx: Oropharynx is clear.   Eyes:      Extraocular Movements: Extraocular movements intact.      Conjunctiva/sclera: Conjunctivae normal.      Pupils: Pupils are equal, round, and reactive to light.   Cardiovascular:      Rate and Rhythm: Normal rate.      Pulses: Normal pulses.   Pulmonary:      Effort: Pulmonary effort is normal.   Abdominal:      General: Abdomen is flat. There is no distension.      Palpations: Abdomen is soft.      Tenderness: There is no abdominal tenderness. There is no guarding or rebound.   Musculoskeletal:         General: Normal range of motion.   Neurological:      General: No focal deficit present.      Mental Status: She is alert and oriented to person, place, and time.   Skin:     General: Skin is warm and dry.   Psychiatric:         Mood and Affect: Mood normal.         Behavior: Behavior normal.   Vitals reviewed. Exam conducted with a chaperone present.          Last Pap: approximate date 2014 and was normal    Immunization History   Administered Date(s) Administered    Moderna SARS-CoV-2 Vaccination 10/15/2021, 01/13/2022      ASSESSMENT & PLAN  Problem List Items Addressed This Visit          Ob-Gyn Problems    PMDD  (premenstrual dysphoric disorder)    Overview     Premenstrual Syndrome/Premenstrual Dysphoric Disorder  - Discussed that PMS is a disorder characterized by behavioral, affective, and physical symptoms that occur the week prior to menses, improve with onset of menses, and resolve one week post-menses and result in significant impairment in quality of life  - Reviewed common symptoms such as irritability, mood swings, anxiety/depression, food cravings, abdominal bloating, severe fatigue, breast tenderness, and hot flashes  - The diagnosis of PMDD requires the presence of at least five symptoms, one of which is affective in nature, that significantly interfere with patient's quality of life and occur over two consecutive menstrual cycles  - Discussed completing Daily Record of Severity of Problems (DRSP) forms daily for two months with plan for follow-up at that time to review results  - RTC in 2 months to review DRSP  - Discussed primary treatment are OCPs and SSRI which she would like to avoid and is already on respectively. Understands, would like definitive diagnosis.          Other Visit Diagnoses       Screening for malignant neoplasm of cervix    -  Primary    Relevant Orders    THINPREP PAP TEST (>30)    Vaginal discharge        Relevant Orders    Vaginitis Gram Stain For Bacterial Vaginosis + Yeast            Follow up: 2-3 months    Mame Hathaway MD  Obstetrics & Gynecology  08/21/24

## 2024-08-22 LAB
CLUE CELLS VAG LPF-#/AREA: NORMAL /[LPF]
NUGENT SCORE: 0
YEAST VAG WET PREP-#/AREA: NORMAL

## 2024-09-08 NOTE — PROGRESS NOTES
Subjective   Patient ID: Kathleen Chacko is a 34 y.o. female who presents for Follow-up (  Recently was in ER for suicidal ideation and transferred to [facility] for 4-5 days and switched to Effexor.  Recently had covid as well, still having congestion and cough. ).    HPI   35 yo female presents for fu  Hx of covid last week.  Feeling better.  Has already tested negative.  Her family had it as well.    Was seen in ER on 8/30 for suicidal thoughts and worsening depression.  Was transferred to [place] for 4 to 5 days and her medication was changed to Effexor.  She has seen her psychiatrist for follow-up as well as her gynecologist for possible PMDD.  She is currently feeling better on the Effexor and has no current suicidal thoughts.      BMI 37; frustrated with her weight; she is interested in possibly trying Wegovy.  Will discuss with Endo.    She denies any chest pains, palpitations, edema, shortness of breath, abdominal pains, reflux, nausea, vomiting, diarrhea, constipation, blood in stool, melena.      History of hypothyroidism.  Was started on levothyroxine after labs in August 2022.  Tsh/4 in June was wnl  +TPO ab;   Has never had a thyroid ultrasound.  No neck swelling or tenderness    History of mild anemia and elevated platelets and ferritin; normal iron and b12;   Has upcoming appt with endo and heme  Fam hx of anemia    ERIK and RF were neg last yr    Her periods are more reg. occ heavy flow.    Denies any nausea, vomiting, reflux, diarrhea, constipation, blood in stool or melena.     Hx of alcoholism- sober for 9 yrs. No urges to drink  does have a sponsor.  Has supportive       Review of Systems  ROS as stated in HPI    Objective       Physical Exam  Constitutional: A&O; NAD  HEENT: conjunctiva normal. EOMI; PERRLA; lids normal; TM's clear;   Neck: supple; No lymphadenopathy   Heart: RRR     Lungs: CTA bilateral   Abd: Soft, Nontender, Nondistended  Ext:  No edema;    Neuro: No gross neuro  deficits     Psych: Judgment, orientation, mood, affect, insight wnl   Assessment/Plan   Problem List Items Addressed This Visit             ICD-10-CM    Depression F32.A    Hypothyroidism - Primary E03.9   Recent COVID symptoms improving  Reviewed records from recent hospitalization as well gyn visit.  Follow-up with psychiatrist.  Has upcoming appointments with Bertha and letitia.

## 2024-09-10 ENCOUNTER — APPOINTMENT (OUTPATIENT)
Dept: PRIMARY CARE | Facility: CLINIC | Age: 34
End: 2024-09-10
Payer: COMMERCIAL

## 2024-09-10 VITALS
HEART RATE: 80 BPM | SYSTOLIC BLOOD PRESSURE: 136 MMHG | WEIGHT: 172.6 LBS | BODY MASS INDEX: 37.24 KG/M2 | HEIGHT: 57 IN | DIASTOLIC BLOOD PRESSURE: 81 MMHG | TEMPERATURE: 98.7 F

## 2024-09-10 DIAGNOSIS — E03.9 HYPOTHYROIDISM, UNSPECIFIED TYPE: Primary | ICD-10-CM

## 2024-09-10 DIAGNOSIS — F32.A DEPRESSION, UNSPECIFIED DEPRESSION TYPE: ICD-10-CM

## 2024-09-10 PROCEDURE — 1036F TOBACCO NON-USER: CPT | Performed by: FAMILY MEDICINE

## 2024-09-10 PROCEDURE — 3008F BODY MASS INDEX DOCD: CPT | Performed by: FAMILY MEDICINE

## 2024-09-10 PROCEDURE — 99213 OFFICE O/P EST LOW 20 MIN: CPT | Performed by: FAMILY MEDICINE

## 2024-09-10 ASSESSMENT — PATIENT HEALTH QUESTIONNAIRE - PHQ9
1. LITTLE INTEREST OR PLEASURE IN DOING THINGS: NOT AT ALL
SUM OF ALL RESPONSES TO PHQ9 QUESTIONS 1 AND 2: 0
2. FEELING DOWN, DEPRESSED OR HOPELESS: NOT AT ALL

## 2024-09-13 ENCOUNTER — HOSPITAL ENCOUNTER (OUTPATIENT)
Dept: NEUROLOGY | Facility: HOSPITAL | Age: 34
Discharge: HOME | End: 2024-09-13
Payer: COMMERCIAL

## 2024-09-13 DIAGNOSIS — R20.2 PARESTHESIA OF UPPER AND LOWER EXTREMITIES OF BOTH SIDES: ICD-10-CM

## 2024-09-13 DIAGNOSIS — M54.2 NECK PAIN: ICD-10-CM

## 2024-09-13 DIAGNOSIS — M54.9 BACK PAIN, UNSPECIFIED BACK LOCATION, UNSPECIFIED BACK PAIN LATERALITY, UNSPECIFIED CHRONICITY: ICD-10-CM

## 2024-09-13 PROCEDURE — 95886 MUSC TEST DONE W/N TEST COMP: CPT | Performed by: PSYCHIATRY & NEUROLOGY

## 2024-09-13 PROCEDURE — 95911 NRV CNDJ TEST 9-10 STUDIES: CPT | Performed by: PSYCHIATRY & NEUROLOGY

## 2024-09-16 ENCOUNTER — TELEPHONE (OUTPATIENT)
Dept: PRIMARY CARE | Facility: CLINIC | Age: 34
End: 2024-09-16
Payer: COMMERCIAL

## 2024-09-16 NOTE — TELEPHONE ENCOUNTER
----- Message from Vandana Batista sent at 9/15/2024 10:43 AM EDT -----  Please let patient know her nerve conduction test was normal  ----- Message -----  From: Heidy Anand DO  Sent: 9/13/2024  12:39 PM EDT  To: Vandana Batista DO

## 2024-09-27 PROBLEM — R79.89 ELEVATED TSH: Status: ACTIVE | Noted: 2024-09-27

## 2024-09-27 PROBLEM — O14.10 SEVERE PRE-ECLAMPSIA: Status: ACTIVE | Noted: 2022-04-02

## 2024-09-27 PROBLEM — F32.A DEPRESSIVE DISORDER: Status: ACTIVE | Noted: 2023-06-06

## 2024-09-27 PROBLEM — F33.42 MAJOR DEPRESSIVE DISORDER, RECURRENT, IN FULL REMISSION (CMS-HCC): Status: ACTIVE | Noted: 2023-09-05

## 2024-09-27 PROBLEM — H54.7 VISUAL IMPAIRMENT: Status: ACTIVE | Noted: 2024-09-27

## 2024-09-27 PROBLEM — R74.01 HIGH ASPARTATE TRANSAMINASE MEASUREMENT: Status: ACTIVE | Noted: 2022-04-06

## 2024-09-27 PROBLEM — G43.E09 CHRONIC MIGRAINE WITH AURA: Status: ACTIVE | Noted: 2024-09-27

## 2024-09-27 PROBLEM — R00.2 PALPITATIONS: Status: ACTIVE | Noted: 2023-05-26

## 2024-09-27 PROBLEM — R79.89 HIGH THYROID STIMULATING HORMONE (TSH) LEVEL: Status: ACTIVE | Noted: 2022-03-09

## 2024-09-27 PROBLEM — H54.62 VISION LOSS, LEFT EYE: Status: ACTIVE | Noted: 2024-09-27

## 2024-09-27 PROBLEM — R21 RASH: Status: ACTIVE | Noted: 2024-09-27

## 2024-09-27 PROBLEM — M25.50 JOINT PAIN: Status: ACTIVE | Noted: 2024-09-27

## 2024-09-27 PROBLEM — B37.2 CANDIDAL INTERTRIGO: Status: ACTIVE | Noted: 2024-09-27

## 2024-09-27 PROBLEM — F41.1 GENERALIZED ANXIETY DISORDER: Status: ACTIVE | Noted: 2023-09-05

## 2024-09-27 PROBLEM — J02.9 PHARYNGITIS: Status: ACTIVE | Noted: 2023-06-06

## 2024-09-27 PROBLEM — L70.9 ACNE: Status: ACTIVE | Noted: 2024-09-27

## 2024-09-27 PROBLEM — R53.83 FATIGUE: Status: ACTIVE | Noted: 2024-09-27

## 2024-09-27 PROBLEM — R51.9 HEADACHE: Status: ACTIVE | Noted: 2017-10-05

## 2024-09-27 PROBLEM — E55.9 VITAMIN D DEFICIENCY: Status: ACTIVE | Noted: 2024-09-27

## 2024-09-27 PROBLEM — G43.909 MIGRAINE: Status: ACTIVE | Noted: 2024-09-27

## 2024-09-27 PROBLEM — M54.2 NECK PAIN: Status: ACTIVE | Noted: 2024-09-27

## 2024-09-27 PROBLEM — E66.9 OBESITY: Status: ACTIVE | Noted: 2024-09-27

## 2024-09-27 PROBLEM — R20.2 PARESTHESIA: Status: ACTIVE | Noted: 2024-09-27

## 2024-09-27 RX ORDER — CLOBETASOL PROPIONATE 0.5 MG/ML
SOLUTION TOPICAL
COMMUNITY
Start: 2024-05-09

## 2024-09-27 RX ORDER — TRIAMCINOLONE ACETONIDE 1 MG/G
CREAM TOPICAL
COMMUNITY
Start: 2024-06-28

## 2024-09-27 RX ORDER — FLUCONAZOLE 150 MG/1
TABLET ORAL
COMMUNITY
Start: 2024-07-11

## 2024-09-27 RX ORDER — DOXYCYCLINE 100 MG/1
1 CAPSULE ORAL
COMMUNITY
Start: 2024-06-12

## 2024-09-27 RX ORDER — CEPHALEXIN 500 MG/1
1 CAPSULE ORAL EVERY 6 HOURS
COMMUNITY
Start: 2024-06-18

## 2024-09-27 RX ORDER — MUPIROCIN 20 MG/G
OINTMENT TOPICAL
COMMUNITY
Start: 2024-06-18

## 2024-09-27 RX ORDER — SERTRALINE HYDROCHLORIDE 25 MG/1
12.5 TABLET, FILM COATED ORAL DAILY
COMMUNITY
Start: 2024-03-01

## 2024-09-27 RX ORDER — PREDNISONE 10 MG/1
TABLET ORAL
COMMUNITY
Start: 2024-05-09

## 2024-09-27 RX ORDER — GLUCOSA SU 2KCL/CHONDROITIN SU 500-400 MG
1 CAPSULE ORAL
COMMUNITY
Start: 2024-08-08

## 2024-09-27 RX ORDER — SERTRALINE HYDROCHLORIDE 50 MG/1
TABLET, FILM COATED ORAL
COMMUNITY
Start: 2023-12-15

## 2024-09-27 RX ORDER — IBUPROFEN 600 MG/1
TABLET ORAL EVERY 6 HOURS PRN
COMMUNITY
Start: 2022-04-05

## 2024-10-06 DIAGNOSIS — E03.9 HYPOTHYROIDISM, UNSPECIFIED TYPE: ICD-10-CM

## 2024-10-07 RX ORDER — LEVOTHYROXINE SODIUM 50 UG/1
75 TABLET ORAL DAILY
Qty: 135 TABLET | Refills: 1 | Status: SHIPPED | OUTPATIENT
Start: 2024-10-07

## 2024-10-09 ENCOUNTER — LAB (OUTPATIENT)
Dept: LAB | Facility: CLINIC | Age: 34
End: 2024-10-09
Payer: COMMERCIAL

## 2024-10-09 ENCOUNTER — OFFICE VISIT (OUTPATIENT)
Dept: HEMATOLOGY/ONCOLOGY | Facility: CLINIC | Age: 34
End: 2024-10-09
Payer: COMMERCIAL

## 2024-10-09 VITALS
OXYGEN SATURATION: 99 % | BODY MASS INDEX: 36.91 KG/M2 | WEIGHT: 171.08 LBS | TEMPERATURE: 97.3 F | SYSTOLIC BLOOD PRESSURE: 114 MMHG | DIASTOLIC BLOOD PRESSURE: 81 MMHG | HEIGHT: 57 IN | HEART RATE: 74 BPM | RESPIRATION RATE: 18 BRPM

## 2024-10-09 DIAGNOSIS — D64.9 ANEMIA, UNSPECIFIED TYPE: Primary | ICD-10-CM

## 2024-10-09 DIAGNOSIS — D75.839 THROMBOCYTOSIS: ICD-10-CM

## 2024-10-09 DIAGNOSIS — D64.9 ANEMIA, UNSPECIFIED TYPE: ICD-10-CM

## 2024-10-09 LAB
BASOPHILS # BLD AUTO: 0.04 X10*3/UL (ref 0–0.1)
BASOPHILS NFR BLD AUTO: 0.6 %
DAT-POLYSPECIFIC: NORMAL
EOSINOPHIL # BLD AUTO: 0.32 X10*3/UL (ref 0–0.7)
EOSINOPHIL NFR BLD AUTO: 4.5 %
ERYTHROCYTE [DISTWIDTH] IN BLOOD BY AUTOMATED COUNT: 13.1 % (ref 11.5–14.5)
FERRITIN SERPL-MCNC: 103 NG/ML (ref 8–150)
FOLATE SERPL-MCNC: 15 NG/ML
HAPTOGLOB SERPL NEPH-MCNC: 242 MG/DL (ref 30–200)
HCT VFR BLD AUTO: 38.1 % (ref 36–46)
HGB BLD-MCNC: 12.1 G/DL (ref 12–16)
HGB RETIC QN: 31 PG (ref 28–38)
IMM GRANULOCYTES # BLD AUTO: 0.03 X10*3/UL (ref 0–0.7)
IMM GRANULOCYTES NFR BLD AUTO: 0.4 % (ref 0–0.9)
IMMATURE RETIC FRACTION: 8.1 %
IRON SATN MFR SERPL: 19 % (ref 25–45)
IRON SERPL-MCNC: 71 UG/DL (ref 35–150)
LDH SERPL L TO P-CCNC: 146 U/L (ref 84–246)
LYMPHOCYTES # BLD AUTO: 2.21 X10*3/UL (ref 1.2–4.8)
LYMPHOCYTES NFR BLD AUTO: 31.3 %
MCH RBC QN AUTO: 28.1 PG (ref 26–34)
MCHC RBC AUTO-ENTMCNC: 31.8 G/DL (ref 32–36)
MCV RBC AUTO: 88 FL (ref 80–100)
MONOCYTES # BLD AUTO: 0.45 X10*3/UL (ref 0.1–1)
MONOCYTES NFR BLD AUTO: 6.4 %
NEUTROPHILS # BLD AUTO: 4.01 X10*3/UL (ref 1.2–7.7)
NEUTROPHILS NFR BLD AUTO: 56.8 %
NRBC BLD-RTO: 0 /100 WBCS (ref 0–0)
PLATELET # BLD AUTO: 409 X10*3/UL (ref 150–450)
PROT SERPL-MCNC: 7.4 G/DL (ref 6.4–8.2)
RBC # BLD AUTO: 4.31 X10*6/UL (ref 4–5.2)
RETICS #: 0.05 X10*6/UL (ref 0.02–0.08)
RETICS/RBC NFR AUTO: 1.3 % (ref 0.5–2)
TIBC SERPL-MCNC: 381 UG/DL (ref 240–445)
UIBC SERPL-MCNC: 310 UG/DL (ref 110–370)
VIT B12 SERPL-MCNC: 533 PG/ML (ref 211–911)
WBC # BLD AUTO: 7.1 X10*3/UL (ref 4.4–11.3)

## 2024-10-09 PROCEDURE — 82746 ASSAY OF FOLIC ACID SERUM: CPT | Mod: PARLAB

## 2024-10-09 PROCEDURE — 82728 ASSAY OF FERRITIN: CPT | Mod: PARLAB

## 2024-10-09 PROCEDURE — 83615 LACTATE (LD) (LDH) ENZYME: CPT

## 2024-10-09 PROCEDURE — 85045 AUTOMATED RETICULOCYTE COUNT: CPT

## 2024-10-09 PROCEDURE — 99215 OFFICE O/P EST HI 40 MIN: CPT | Performed by: INTERNAL MEDICINE

## 2024-10-09 PROCEDURE — 36415 COLL VENOUS BLD VENIPUNCTURE: CPT

## 2024-10-09 PROCEDURE — 3074F SYST BP LT 130 MM HG: CPT | Performed by: INTERNAL MEDICINE

## 2024-10-09 PROCEDURE — 3008F BODY MASS INDEX DOCD: CPT | Performed by: INTERNAL MEDICINE

## 2024-10-09 PROCEDURE — 82607 VITAMIN B-12: CPT | Mod: PARLAB

## 2024-10-09 PROCEDURE — 85025 COMPLETE CBC W/AUTO DIFF WBC: CPT

## 2024-10-09 PROCEDURE — 99205 OFFICE O/P NEW HI 60 MIN: CPT | Performed by: INTERNAL MEDICINE

## 2024-10-09 PROCEDURE — 86880 COOMBS TEST DIRECT: CPT

## 2024-10-09 PROCEDURE — 84155 ASSAY OF PROTEIN SERUM: CPT | Mod: PARLAB

## 2024-10-09 PROCEDURE — 83010 ASSAY OF HAPTOGLOBIN QUANT: CPT | Mod: PARLAB

## 2024-10-09 PROCEDURE — 83550 IRON BINDING TEST: CPT

## 2024-10-09 PROCEDURE — 3079F DIAST BP 80-89 MM HG: CPT | Performed by: INTERNAL MEDICINE

## 2024-10-09 ASSESSMENT — COLUMBIA-SUICIDE SEVERITY RATING SCALE - C-SSRS
1. IN THE PAST MONTH, HAVE YOU WISHED YOU WERE DEAD OR WISHED YOU COULD GO TO SLEEP AND NOT WAKE UP?: YES
5. HAVE YOU STARTED TO WORK OUT OR WORKED OUT THE DETAILS OF HOW TO KILL YOURSELF? DO YOU INTEND TO CARRY OUT THIS PLAN?: YES
2. HAVE YOU ACTUALLY HAD ANY THOUGHTS OF KILLING YOURSELF?: YES
4. HAVE YOU HAD THESE THOUGHTS AND HAD SOME INTENTION OF ACTING ON THEM?: NO
6. HAVE YOU EVER DONE ANYTHING, STARTED TO DO ANYTHING, OR PREPARED TO DO ANYTHING TO END YOUR LIFE?: NO

## 2024-10-09 ASSESSMENT — PATIENT HEALTH QUESTIONNAIRE - PHQ9
SUM OF ALL RESPONSES TO PHQ9 QUESTIONS 1 AND 2: 0
2. FEELING DOWN, DEPRESSED OR HOPELESS: NOT AT ALL
1. LITTLE INTEREST OR PLEASURE IN DOING THINGS: NOT AT ALL

## 2024-10-09 ASSESSMENT — ENCOUNTER SYMPTOMS
OCCASIONAL FEELINGS OF UNSTEADINESS: 0
LOSS OF SENSATION IN FEET: 0

## 2024-10-09 ASSESSMENT — ANXIETY QUESTIONNAIRES
IF YOU CHECKED OFF ANY PROBLEMS ON THIS QUESTIONNAIRE, HOW DIFFICULT HAVE THESE PROBLEMS MADE IT FOR YOU TO DO YOUR WORK, TAKE CARE OF THINGS AT HOME, OR GET ALONG WITH OTHER PEOPLE: NOT DIFFICULT AT ALL
7. FEELING AFRAID AS IF SOMETHING AWFUL MIGHT HAPPEN: MORE THAN HALF THE DAYS
5. BEING SO RESTLESS THAT IT IS HARD TO SIT STILL: NEARLY EVERY DAY
3. WORRYING TOO MUCH ABOUT DIFFERENT THINGS: MORE THAN HALF THE DAYS
2. NOT BEING ABLE TO STOP OR CONTROL WORRYING: NEARLY EVERY DAY
GAD7 TOTAL SCORE: 16
1. FEELING NERVOUS, ANXIOUS, OR ON EDGE: NEARLY EVERY DAY
4. TROUBLE RELAXING: NEARLY EVERY DAY
6. BECOMING EASILY ANNOYED OR IRRITABLE: NOT AT ALL

## 2024-10-09 ASSESSMENT — PAIN SCALES - GENERAL: PAINLEVEL: 0-NO PAIN

## 2024-10-09 NOTE — PROGRESS NOTES
"Patient ID: : Kathleen Chacko            Primary Care Provider: Vandana Batista DO    LOCATION:  [facility]    REFERRING PHYSICIAN:  Vandana Batista DO    HEMATOLOGY ONCOLOGY PROBLEMS:  1.  Anemia    CHIEF COMPLAINTS:  Patient is in the clinic today for evaluation of anemia.  Multiple medical problem was also noted to have chronic mild anemia.  Blood work from    HISTORY:  Kathleen Chacko is a 34 y.o. female June 2024 showed a hemoglobin of 11.7 with MCV of 88.  WBC was 9.2 with platelets of 477.  Percentage iron saturation was 22 with ferritin of 163.  Metabolic profile, vitamin B12 and folate level were normal.  Vitamin D was low at 27.  Clinically she feels tired and fatigue but there are no other localizing signs and symptoms.  As per the patient her.  Slightly heavy and irregular at times.    INTERVAL HISTORY:  Patient denies any new complaints other than persistent weakness and fatigue. No history of nausea, vomiting, fever, night sweats, diarrhea, rash, anorexia or weight loss. No recent changes in medications.    PAST MEDICAL HISTORY:  1.  Anxiety/depression.  2.  Hypothyroidism  3.  PMDD  4.  Anemia    SOCIAL HISTORY:   and lives in [place].  On and off smoking history.  She has been sober since January 2015.  History of heavy alcohol use prior to that.  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  Born and raised in [facility].    FAMILY HISTORY:  Parents in their 67 in good health.  3 siblings and 2 children all alive and well.  No other family history of bleeding, clotting or malignant disorders in the family history.    REVIEW OF SYSTEMS:   Pertinent finding as per the history above.  All other systems have been reviewed and generally negative and noncontributory.    VITAL SIGNS  BSA: 1.77 meters squared  /81   Pulse 74   Temp 36.3 °C (97.3 °F)   Resp 18   Ht 1.448 m (4' 9\")   Wt 77.6 kg (171 lb 1.2 oz)   SpO2 99%   BMI 37.02 " kg/m²     PHYSICAL EXAMINATION:  Detailed physical examination not done.    LAB DATA:  Repeat blood work from today are pending.  Previous lab results were reviewed and have been detailed in the history above.    ASSESSMENT / PLAN:  1.  Anemia.  No clear etiology.  Please refer the details as outlined in the history above.  In summary, patient with multiple medical problem was also been noted to have mild normocytic anemia.  Hemoglobin was 11.7 with MCV of 88 on 6/10/2024.  Extensive workup done by Dr. Batista, revealing normal metabolic profile, vitamin B12, folate etc.  Percentage iron saturation was low but ferritin was elevated at 163.  Most likely she has mild normocytic multifactorial anemia but mainly secondary to anemia of chronic disease type picture.  Her.'s are slightly heavy and there is also possibility of intermittent iron deficiency.  To begin with I will complete the hematological evaluation today and we will recheck a CBC along with iron panel, ferritin, for terming B12, folate, LDH, haptoglobin, reticulocyte count, SPEP, TSH, Pawel test, etc.  Depending upon the results of initial workup we will plan further management accordingly including possibility of trial of IV iron infusion if she is confirmed to have iron deficiency.    2.  Follow-up.  She will return to the clinic in 6 months.  In the meantime we will follow-up on the results of pending blood work from today.    I would like to thank Dr. Batista for providing us the opportunity to participate in Ms. Loving's medical care.    A total of 60 minutes were spent in evaluation - performing physical examination, history taking, review of the previous extensive records/information and formulating current and future management plan. Majority of the time was spend in consultation and discussion.    This note has been transcribed using Dragon voice recognition system and there is a possibility of unintentional typing misprints.

## 2024-10-14 LAB
ALBUMIN: 4.5 G/DL (ref 3.4–5)
ALPHA 1 GLOBULIN: 0.3 G/DL (ref 0.2–0.6)
ALPHA 2 GLOBULIN: 0.8 G/DL (ref 0.4–1.1)
BETA GLOBULIN: 0.9 G/DL (ref 0.5–1.2)
GAMMA GLOBULIN: 0.9 G/DL (ref 0.5–1.4)
IMMUNOFIXATION COMMENT: NORMAL
PATH REVIEW - SERUM IMMUNOFIXATION: NORMAL
PATH REVIEW-SERUM PROTEIN ELECTROPHORESIS: NORMAL
PROTEIN ELECTROPHORESIS COMMENT: NORMAL

## 2024-10-18 ENCOUNTER — APPOINTMENT (OUTPATIENT)
Dept: ENDOCRINOLOGY | Facility: CLINIC | Age: 34
End: 2024-10-18
Payer: COMMERCIAL

## 2024-10-18 ENCOUNTER — LAB (OUTPATIENT)
Dept: LAB | Facility: LAB | Age: 34
End: 2024-10-18
Payer: COMMERCIAL

## 2024-10-18 VITALS
BODY MASS INDEX: 36.79 KG/M2 | SYSTOLIC BLOOD PRESSURE: 121 MMHG | WEIGHT: 170 LBS | HEART RATE: 90 BPM | DIASTOLIC BLOOD PRESSURE: 78 MMHG

## 2024-10-18 DIAGNOSIS — E66.9 OBESITY (BMI 30-39.9): Primary | ICD-10-CM

## 2024-10-18 DIAGNOSIS — E06.3 HASHIMOTO'S DISEASE: ICD-10-CM

## 2024-10-18 DIAGNOSIS — E66.9 OBESITY (BMI 30-39.9): ICD-10-CM

## 2024-10-18 LAB
25(OH)D3 SERPL-MCNC: 34 NG/ML (ref 30–100)
ALBUMIN SERPL BCP-MCNC: 4.6 G/DL (ref 3.4–5)
ALP SERPL-CCNC: 70 U/L (ref 33–110)
ALT SERPL W P-5'-P-CCNC: 6 U/L (ref 7–45)
ANION GAP SERPL CALC-SCNC: 11 MMOL/L (ref 10–20)
AST SERPL W P-5'-P-CCNC: 13 U/L (ref 9–39)
BILIRUB SERPL-MCNC: 0.4 MG/DL (ref 0–1.2)
BUN SERPL-MCNC: 15 MG/DL (ref 6–23)
C PEPTIDE SERPL-MCNC: 1.5 NG/ML (ref 0.7–3.9)
CALCIUM SERPL-MCNC: 9.2 MG/DL (ref 8.6–10.6)
CHLORIDE SERPL-SCNC: 104 MMOL/L (ref 98–107)
CO2 SERPL-SCNC: 26 MMOL/L (ref 21–32)
CREAT SERPL-MCNC: 0.72 MG/DL (ref 0.5–1.05)
EGFRCR SERPLBLD CKD-EPI 2021: >90 ML/MIN/1.73M*2
GLUCOSE P FAST SERPL-MCNC: 83 MG/DL (ref 74–99)
GLUCOSE SERPL-MCNC: 83 MG/DL (ref 74–99)
INSULIN P FAST SERPL-ACNC: 6 UIU/ML (ref 3–25)
POTASSIUM SERPL-SCNC: 3.9 MMOL/L (ref 3.5–5.3)
PROT SERPL-MCNC: 7.3 G/DL (ref 6.4–8.2)
PTH-INTACT SERPL-MCNC: 59.1 PG/ML (ref 18.5–88)
SODIUM SERPL-SCNC: 137 MMOL/L (ref 136–145)
T3 SERPL-MCNC: 124 NG/DL (ref 60–200)
T4 FREE SERPL-MCNC: 1.36 NG/DL (ref 0.78–1.48)
TSH SERPL-ACNC: 0.56 MIU/L (ref 0.44–3.98)

## 2024-10-18 PROCEDURE — 3078F DIAST BP <80 MM HG: CPT | Performed by: INTERNAL MEDICINE

## 2024-10-18 PROCEDURE — 82306 VITAMIN D 25 HYDROXY: CPT

## 2024-10-18 PROCEDURE — 80053 COMPREHEN METABOLIC PANEL: CPT

## 2024-10-18 PROCEDURE — 83970 ASSAY OF PARATHORMONE: CPT

## 2024-10-18 PROCEDURE — 99205 OFFICE O/P NEW HI 60 MIN: CPT | Performed by: INTERNAL MEDICINE

## 2024-10-18 PROCEDURE — 82947 ASSAY GLUCOSE BLOOD QUANT: CPT

## 2024-10-18 PROCEDURE — 84681 ASSAY OF C-PEPTIDE: CPT

## 2024-10-18 PROCEDURE — 36415 COLL VENOUS BLD VENIPUNCTURE: CPT

## 2024-10-18 PROCEDURE — 84439 ASSAY OF FREE THYROXINE: CPT

## 2024-10-18 PROCEDURE — 84443 ASSAY THYROID STIM HORMONE: CPT

## 2024-10-18 PROCEDURE — 83525 ASSAY OF INSULIN: CPT

## 2024-10-18 PROCEDURE — 84480 ASSAY TRIIODOTHYRONINE (T3): CPT

## 2024-10-18 PROCEDURE — 84445 ASSAY OF TSI GLOBULIN: CPT

## 2024-10-18 PROCEDURE — 3074F SYST BP LT 130 MM HG: CPT | Performed by: INTERNAL MEDICINE

## 2024-10-18 RX ORDER — CALCIUM CITRATE/VITAMIN D3 200MG-6.25
1 TABLET ORAL NIGHTLY
Qty: 100 STRIP | Refills: 1 | Status: SHIPPED | OUTPATIENT
Start: 2024-10-18

## 2024-10-18 RX ORDER — LANCETS
1 EACH MISCELLANEOUS NIGHTLY
Qty: 100 EACH | Refills: 1 | Status: SHIPPED | OUTPATIENT
Start: 2024-10-18

## 2024-10-18 NOTE — PROGRESS NOTES
Subjective   Kathleen Chacko is a 34 y.o. female with PMHx of anemia, anxiety/ depression,  hypothyroidism is here for evaluation of Hashimoto thyroiditis.  Patient has been on synthroid for 2 years.  She was diagnosed with hypothyroidism after giving birth to her second child as she was falling asleep while sitting.   She is currently on Levothyroxine 75 mcg daily, takes medication in am with water, eats  after 30 minutes of med intake.   Symptoms consist of poor energy and always feeling fatigued.  She reports that she is most energetic just before going to bed.  Patient reports that she feels sleepy all the day.  However, her sleep at night is poor.  She goes to bed at 9 PM and sleeps by 10 PM.  She wakes up approximately 2 times at night around 1:30 AM and 4 AM for bathroom or sometimes to eat.  She usually snacks on a apple, chocolate or brownie.  She reports that she sometimes does not remember eating but finds apple cores next to her bed.  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  Patient reports stable appetite and weight.  She currently weighs 170 pounds.  She has a  and has a regular exercise routine.  She reports having severe migraine which responds to naproxen.  However she reports having blurry vision and purple spots during migraine episodes.  Patient also reports that she is sensitive to light since forever.  Notices some irritation and tearing.  No hoarseness, trouble swallowing, or compressive sx   Denies any chest pain, shortness of breath, palpitations.  No changes in bowel habits  No tremors,prox muscle weakness, cramps, tingling.  Denies any hot flashes, change in libido, night sweats  Has hot and cold intolerance  No nausea/vomit or abdominal pain  last period 9/30/24, regular, can have menorrhagia in few cycles      On SSRI for 7 years,  effexor started recently and helps with PMDD  Family history is positive for Hashimoto disease in mom and thyroid problems in sister    Objective   /78   Pulse 90   Wt 77.1 kg (170 lb)   LMP 10/03/2024   BMI 36.79 kg/m²    Constitutional: NAD, well groomed. AOx3. Cooperative  Skin/Hair: Warm, dry skin.  HEENT: EOMI, Anicteric scleras, No lid lag or lid retraction, No TTP of ocular globes. Dry oral mucosa. Chvostek sign +  Neck: Soft, supple. Non tender, full ROM, no lymphadenopathy. Thyroid gland palpation: non nodular, soft consistency, non tender, no bruit.   Cardiovascular: RRR, no rub or murmurs.  Respiratory: CTAB, no accessory muscle use.  Abdomen: Soft, nontender to palpation.  Extremities: Preserved peripheral pulses, No peripheral edema.  Neuro: Moving all extremities spontaneously. CN's grossly intact. No balance or gait disturbances. DTRs: no delay in relaxation phase.    Lab Results   Component Value Date    TSH 2.88 06/10/2024    FREET4 1.13 06/10/2024      Latest Reference Range & Units 08/11/22 12:40 10/12/22 13:42   Thyroxine, Free 0.78 - 1.48 ng/dL 0.74 (L) 0.88   Thyroid Stimulating Hormone 0.44 - 3.98 mIU/L 7.69 (H) 5.47 (H)   Vitamin D, 25-Hydroxy, Total ng/mL 24 ! 26 !     Thyroid Peroxidase (TPO) Antibody  <=60 IU/mL (6/10/24) >1,000 High          Assessment/Plan   Diagnoses and all orders for this visit:  Obesity (BMI 30-39.9)  -     blood sugar diagnostic (True Metrix Glucose Test Strip) strip; 1 strip once daily at bedtime.  -     lancets misc; 1 Box once daily at bedtime.  Hashimoto's disease  -     Referral to Endocrinology  -     TSH; Future  -     Thyroid stimulating immunoglobulin; Future  -     T4, free; Future  -     T3; Future  -     PTH, intact; Future  -     Vitamin D 25-Hydroxy,Total (for eval of Vitamin D levels); Future  -     Comprehensive metabolic panel; Future      # Hashimoto disease, TPO ab +  # Concern for fasting hypoglycemia as patient describes having  episodes at night where she wakes up to eat sugary stuff.  She reports feeling extreme urge to eat at that time.     Continue levothyroxine at 75 mcg daily, empty stomach with glass of water.  Avoid any food or medication intake for 30 minutes after levothyroxine intake  Obtain labs including TSH, TSI, free T4 and total T3.  Will get PTH, vitamin D and CMP as patient has + Chvostek  True Metrix glucometer provided to patient.  Patient advised to measure blood glucose using glucometer during episodes of night awakenings before food intake. Will get FBS, insulin and C peptide to r/o insulin resistance  Will follow lab result and call to discuss    The patient was seen and discussed with attending Dr. Jeff Grimm.     Flavio Alexandre MD  Endocrinology fellow

## 2024-10-22 ENCOUNTER — TELEPHONE (OUTPATIENT)
Dept: ENDOCRINOLOGY | Facility: HOSPITAL | Age: 34
End: 2024-10-22
Payer: COMMERCIAL

## 2024-10-22 NOTE — TELEPHONE ENCOUNTER
Call patient to discuss lab results.  Patient advised to continue tablet levothyroxine 50 mcg daily and to follow-up with PCP for hypothyroidism.  However, patient was advised to not hesitate and reach out to our clinic for any other issues in future.

## 2024-10-31 LAB — TSI SER-ACNC: <1 TSI INDEX

## 2024-11-06 ENCOUNTER — APPOINTMENT (OUTPATIENT)
Dept: OBSTETRICS AND GYNECOLOGY | Facility: CLINIC | Age: 34
End: 2024-11-06
Payer: COMMERCIAL

## 2024-11-13 ENCOUNTER — APPOINTMENT (OUTPATIENT)
Dept: OBSTETRICS AND GYNECOLOGY | Facility: CLINIC | Age: 34
End: 2024-11-13
Payer: COMMERCIAL

## 2025-02-04 NOTE — PROGRESS NOTES
"Subjective   Patient ID: Kathleen Chacko is a 34 y.o. female who presents for Obesity (Dieting and exercising, but minimal weight loss.  Wants to discuss weight loss medication.).    HPI   33 yo female presents for fu    BMI 36; frustrated with her weight; she is interested in possibly trying Wegovy.    Has been exercising regularly and watching her diet.    She denies any chest pains, palpitations, edema, shortness of breath, abdominal pains, reflux, nausea, vomiting, diarrhea, constipation, blood in stool, melena.      History of hypothyroidism.  Was started on levothyroxine after labs in August 2022.  +TPO ab;   Did see endocrinologist who did labs in October.  Currently on levothyroxine 75 mcg.    History of mild anemia and elevated platelets and ferritin; normal iron and b12;   Saw a heme for evaluation.  Fam hx of anemia    ERIK and RF were neg in past    Denies any nausea, vomiting, reflux, diarrhea, constipation, blood in stool or melena.     History of anxiety and depression.  Sees psychiatrist.  Is on Effexor  mg and trazodone as needed.      Review of Systems  ROS as stated in HPI    Objective   /73   Pulse 88   Temp 36.7 °C (98.1 °F)   Ht 1.448 m (4' 9\")   Wt 75.3 kg (166 lb)   BMI 35.92 kg/m²     Physical Exam  Constitutional: A&O; NAD  Heart: RRR     Lungs: CTA bilateral   Abd: Soft, Nontender, Nondistended  Ext:  No edema;    Neuro: No gross neuro deficits     Psych: Judgment, orientation, mood, affect, insight wnl   Assessment/Plan   Problem List Items Addressed This Visit             ICD-10-CM    Hypothyroidism - Primary E03.9    Relevant Medications    levothyroxine (Synthroid, Levoxyl) 75 mcg tablet    Other Relevant Orders    Thyroid Stimulating Hormone    T4, free    Major depressive disorder, recurrent severe without psychotic features (Multi) F33.2    History of alcoholism (Multi) F10.21    Obesity E66.9    Relevant Medications    semaglutide, weight loss, (Wegovy) 0.25 mg/0.5 mL " pen injector    Vitamin D deficiency E55.9     Other Visit Diagnoses         Codes    Healthcare maintenance     Z00.00    Relevant Orders    CBC    Comprehensive Metabolic Panel    Hemoglobin A1C    Lipid Panel    Vitamin D 25-Hydroxy,Total (for eval of Vitamin D levels)          Check labs.  See if insurance will cover semaglutide.  Continue healthy lifestyle with diet and exercise.  Reviewed recent notes from hematology and endocrinology  Follow-up with psychiatrist.

## 2025-02-06 ENCOUNTER — APPOINTMENT (OUTPATIENT)
Dept: PRIMARY CARE | Facility: CLINIC | Age: 35
End: 2025-02-06
Payer: COMMERCIAL

## 2025-02-06 VITALS
BODY MASS INDEX: 35.81 KG/M2 | HEART RATE: 88 BPM | TEMPERATURE: 98.1 F | SYSTOLIC BLOOD PRESSURE: 106 MMHG | HEIGHT: 57 IN | DIASTOLIC BLOOD PRESSURE: 73 MMHG | WEIGHT: 166 LBS

## 2025-02-06 DIAGNOSIS — E03.9 HYPOTHYROIDISM, UNSPECIFIED TYPE: Primary | ICD-10-CM

## 2025-02-06 DIAGNOSIS — E55.9 VITAMIN D DEFICIENCY: ICD-10-CM

## 2025-02-06 DIAGNOSIS — E66.812 CLASS 2 OBESITY WITHOUT SERIOUS COMORBIDITY WITH BODY MASS INDEX (BMI) OF 35.0 TO 35.9 IN ADULT, UNSPECIFIED OBESITY TYPE: ICD-10-CM

## 2025-02-06 DIAGNOSIS — F10.21 HISTORY OF ALCOHOLISM (MULTI): ICD-10-CM

## 2025-02-06 DIAGNOSIS — F33.2 MAJOR DEPRESSIVE DISORDER, RECURRENT SEVERE WITHOUT PSYCHOTIC FEATURES (MULTI): ICD-10-CM

## 2025-02-06 DIAGNOSIS — Z00.00 HEALTHCARE MAINTENANCE: ICD-10-CM

## 2025-02-06 PROCEDURE — 3074F SYST BP LT 130 MM HG: CPT | Performed by: FAMILY MEDICINE

## 2025-02-06 PROCEDURE — 3008F BODY MASS INDEX DOCD: CPT | Performed by: FAMILY MEDICINE

## 2025-02-06 PROCEDURE — 3078F DIAST BP <80 MM HG: CPT | Performed by: FAMILY MEDICINE

## 2025-02-06 PROCEDURE — 99214 OFFICE O/P EST MOD 30 MIN: CPT | Performed by: FAMILY MEDICINE

## 2025-02-06 PROCEDURE — 4004F PT TOBACCO SCREEN RCVD TLK: CPT | Performed by: FAMILY MEDICINE

## 2025-02-06 RX ORDER — LEVOTHYROXINE SODIUM 75 UG/1
75 TABLET ORAL DAILY
Qty: 90 TABLET | Refills: 1 | Status: SHIPPED | OUTPATIENT
Start: 2025-02-06

## 2025-02-06 RX ORDER — SEMAGLUTIDE 0.25 MG/.5ML
0.25 INJECTION, SOLUTION SUBCUTANEOUS WEEKLY
Qty: 2 ML | Refills: 0 | Status: SHIPPED | OUTPATIENT
Start: 2025-02-06 | End: 2025-02-28

## 2025-02-10 DIAGNOSIS — E66.9 OBESITY (BMI 30-39.9): ICD-10-CM

## 2025-02-10 RX ORDER — CALCIUM CITRATE/VITAMIN D3 200MG-6.25
TABLET ORAL
Qty: 100 STRIP | Refills: 1 | Status: SHIPPED | OUTPATIENT
Start: 2025-02-10

## 2025-02-10 RX ORDER — LANCETS 33 GAUGE
EACH MISCELLANEOUS
Qty: 100 EACH | Refills: 1 | Status: SHIPPED | OUTPATIENT
Start: 2025-02-10

## 2025-02-25 ENCOUNTER — TELEPHONE (OUTPATIENT)
Dept: PRIMARY CARE | Facility: CLINIC | Age: 35
End: 2025-02-25
Payer: COMMERCIAL

## 2025-04-09 ENCOUNTER — APPOINTMENT (OUTPATIENT)
Dept: HEMATOLOGY/ONCOLOGY | Facility: CLINIC | Age: 35
End: 2025-04-09
Payer: COMMERCIAL

## 2025-07-14 ENCOUNTER — APPOINTMENT (OUTPATIENT)
Dept: PRIMARY CARE | Facility: CLINIC | Age: 35
End: 2025-07-14
Payer: COMMERCIAL

## 2025-07-21 DIAGNOSIS — E03.9 HYPOTHYROIDISM, UNSPECIFIED TYPE: ICD-10-CM

## 2025-07-21 RX ORDER — LEVOTHYROXINE SODIUM 75 UG/1
75 TABLET ORAL DAILY
Qty: 90 TABLET | Refills: 0 | Status: SHIPPED | OUTPATIENT
Start: 2025-07-21